# Patient Record
Sex: MALE | Race: WHITE | NOT HISPANIC OR LATINO | Employment: UNEMPLOYED | URBAN - METROPOLITAN AREA
[De-identification: names, ages, dates, MRNs, and addresses within clinical notes are randomized per-mention and may not be internally consistent; named-entity substitution may affect disease eponyms.]

---

## 2022-03-15 ENCOUNTER — HOSPITAL ENCOUNTER (OUTPATIENT)
Dept: ONCOLOGY | Facility: HOSPITAL | Age: 32
Setting detail: INFUSION SERIES
Discharge: HOME | End: 2022-03-15

## 2022-03-15 ENCOUNTER — TRANSCRIBE ORDERS (OUTPATIENT)
Dept: LAB | Facility: HOSPITAL | Age: 32
End: 2022-03-15

## 2022-03-15 ENCOUNTER — LAB (OUTPATIENT)
Dept: LAB | Facility: HOSPITAL | Age: 32
End: 2022-03-15

## 2022-03-15 VITALS
WEIGHT: 185 LBS | HEIGHT: 72 IN | HEART RATE: 119 BPM | RESPIRATION RATE: 16 BRPM | BODY MASS INDEX: 25.06 KG/M2 | TEMPERATURE: 97.8 F | DIASTOLIC BLOOD PRESSURE: 78 MMHG | SYSTOLIC BLOOD PRESSURE: 112 MMHG

## 2022-03-15 DIAGNOSIS — J18.9 UNRESOLVED PNEUMONIA: ICD-10-CM

## 2022-03-15 DIAGNOSIS — J18.9 UNRESOLVED PNEUMONIA: Primary | ICD-10-CM

## 2022-03-15 LAB
ALBUMIN SERPL-MCNC: 3.4 G/DL (ref 3.5–5.2)
ALBUMIN/GLOB SERPL: 0.7 G/DL
ALP SERPL-CCNC: 234 U/L (ref 39–117)
ALT SERPL W P-5'-P-CCNC: 47 U/L (ref 1–41)
ANION GAP SERPL CALCULATED.3IONS-SCNC: 11 MMOL/L (ref 5–15)
AST SERPL-CCNC: 30 U/L (ref 1–40)
BASOPHILS # BLD AUTO: 0.12 10*3/MM3 (ref 0–0.2)
BASOPHILS NFR BLD AUTO: 1.3 % (ref 0–1.5)
BILIRUB SERPL-MCNC: 0.2 MG/DL (ref 0–1.2)
BUN SERPL-MCNC: 18 MG/DL (ref 6–20)
BUN/CREAT SERPL: 15.4 (ref 7–25)
CALCIUM SPEC-SCNC: 9.5 MG/DL (ref 8.6–10.5)
CHLORIDE SERPL-SCNC: 101 MMOL/L (ref 98–107)
CO2 SERPL-SCNC: 25 MMOL/L (ref 22–29)
CREAT SERPL-MCNC: 1.17 MG/DL (ref 0.76–1.27)
CRP SERPL-MCNC: 0.73 MG/DL (ref 0–0.5)
DEPRECATED RDW RBC AUTO: 47.7 FL (ref 37–54)
EGFRCR SERPLBLD CKD-EPI 2021: 85.5 ML/MIN/1.73
EOSINOPHIL # BLD AUTO: 0.25 10*3/MM3 (ref 0–0.4)
EOSINOPHIL NFR BLD AUTO: 2.8 % (ref 0.3–6.2)
ERYTHROCYTE [DISTWIDTH] IN BLOOD BY AUTOMATED COUNT: 14.6 % (ref 12.3–15.4)
ERYTHROCYTE [SEDIMENTATION RATE] IN BLOOD: 61 MM/HR (ref 0–15)
GLOBULIN UR ELPH-MCNC: 5.2 GM/DL
GLUCOSE SERPL-MCNC: 93 MG/DL (ref 65–99)
HCT VFR BLD AUTO: 38.3 % (ref 37.5–51)
HGB BLD-MCNC: 12.3 G/DL (ref 13–17.7)
IMM GRANULOCYTES # BLD AUTO: 0.38 10*3/MM3 (ref 0–0.05)
IMM GRANULOCYTES NFR BLD AUTO: 4.2 % (ref 0–0.5)
LYMPHOCYTES # BLD AUTO: 2.17 10*3/MM3 (ref 0.7–3.1)
LYMPHOCYTES NFR BLD AUTO: 24 % (ref 19.6–45.3)
MCH RBC QN AUTO: 29.3 PG (ref 26.6–33)
MCHC RBC AUTO-ENTMCNC: 32.1 G/DL (ref 31.5–35.7)
MCV RBC AUTO: 91.2 FL (ref 79–97)
MONOCYTES # BLD AUTO: 0.52 10*3/MM3 (ref 0.1–0.9)
MONOCYTES NFR BLD AUTO: 5.7 % (ref 5–12)
NEUTROPHILS NFR BLD AUTO: 5.61 10*3/MM3 (ref 1.7–7)
NEUTROPHILS NFR BLD AUTO: 62 % (ref 42.7–76)
NRBC BLD AUTO-RTO: 0 /100 WBC (ref 0–0.2)
PLATELET # BLD AUTO: 676 10*3/MM3 (ref 140–450)
PMV BLD AUTO: 8.6 FL (ref 6–12)
POTASSIUM SERPL-SCNC: 4.4 MMOL/L (ref 3.5–5.2)
PROT SERPL-MCNC: 8.6 G/DL (ref 6–8.5)
RBC # BLD AUTO: 4.2 10*6/MM3 (ref 4.14–5.8)
SODIUM SERPL-SCNC: 137 MMOL/L (ref 136–145)
WBC NRBC COR # BLD: 9.05 10*3/MM3 (ref 3.4–10.8)

## 2022-03-15 PROCEDURE — 85652 RBC SED RATE AUTOMATED: CPT

## 2022-03-15 PROCEDURE — 36415 COLL VENOUS BLD VENIPUNCTURE: CPT

## 2022-03-15 PROCEDURE — 86140 C-REACTIVE PROTEIN: CPT

## 2022-03-15 PROCEDURE — 80053 COMPREHEN METABOLIC PANEL: CPT

## 2022-03-15 PROCEDURE — G0463 HOSPITAL OUTPT CLINIC VISIT: HCPCS

## 2022-03-15 PROCEDURE — 85025 COMPLETE CBC W/AUTO DIFF WBC: CPT

## 2022-03-15 RX ORDER — METOPROLOL TARTRATE 50 MG/1
50 TABLET, FILM COATED ORAL DAILY
COMMUNITY
End: 2022-04-13 | Stop reason: SDUPTHER

## 2022-03-16 ENCOUNTER — TELEPHONE (OUTPATIENT)
Dept: CARDIOLOGY | Facility: CLINIC | Age: 32
End: 2022-03-16

## 2022-03-16 DIAGNOSIS — R06.02 SHORTNESS OF BREATH: ICD-10-CM

## 2022-03-16 DIAGNOSIS — I87.1 SVC (SUPERIOR VENA CAVA OBSTRUCTION): Primary | ICD-10-CM

## 2022-03-16 NOTE — TELEPHONE ENCOUNTER
This is the patient referred by DEENA Solis that you wanted to order an echo and consult in the next couple of weeks.

## 2022-03-31 ENCOUNTER — OFFICE VISIT (OUTPATIENT)
Dept: CARDIOLOGY | Facility: CLINIC | Age: 32
End: 2022-03-31

## 2022-03-31 VITALS
HEART RATE: 72 BPM | BODY MASS INDEX: 26.14 KG/M2 | WEIGHT: 193 LBS | SYSTOLIC BLOOD PRESSURE: 138 MMHG | DIASTOLIC BLOOD PRESSURE: 96 MMHG | HEIGHT: 72 IN

## 2022-03-31 DIAGNOSIS — E88.81 METABOLIC SYNDROME: ICD-10-CM

## 2022-03-31 DIAGNOSIS — R07.2 PRECORDIAL PAIN: ICD-10-CM

## 2022-03-31 DIAGNOSIS — I10 PRIMARY HYPERTENSION: ICD-10-CM

## 2022-03-31 DIAGNOSIS — J98.2 PNEUMOMEDIASTINUM: Primary | ICD-10-CM

## 2022-03-31 DIAGNOSIS — R06.02 SHORTNESS OF BREATH: ICD-10-CM

## 2022-03-31 DIAGNOSIS — R79.89 ABNORMAL LFTS: ICD-10-CM

## 2022-03-31 DIAGNOSIS — R01.1 MURMUR, CARDIAC: ICD-10-CM

## 2022-03-31 PROCEDURE — 93000 ELECTROCARDIOGRAM COMPLETE: CPT | Performed by: INTERNAL MEDICINE

## 2022-03-31 PROCEDURE — 99244 OFF/OP CNSLTJ NEW/EST MOD 40: CPT | Performed by: INTERNAL MEDICINE

## 2022-03-31 RX ORDER — SACCHAROMYCES BOULARDII 250 MG
250 CAPSULE ORAL 2 TIMES DAILY
COMMUNITY

## 2022-03-31 RX ORDER — ERGOCALCIFEROL 1.25 MG/1
50000 CAPSULE ORAL WEEKLY
COMMUNITY

## 2022-03-31 RX ORDER — AMOXICILLIN AND CLAVULANATE POTASSIUM 500; 125 MG/1; MG/1
1 TABLET, FILM COATED ORAL 2 TIMES DAILY
COMMUNITY

## 2022-03-31 RX ORDER — PANTOPRAZOLE SODIUM 40 MG/1
40 TABLET, DELAYED RELEASE ORAL DAILY
COMMUNITY

## 2022-03-31 NOTE — PROGRESS NOTES
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 Saw PCP, WBC was 38, PCP sent him to ER at [facility] in [place]. SVC clot diagnosed there with eliquis started prior to discharge. Requested records.    • Labs     Recent labs at [facility], will print results     • Hypertension     Metoprolol tart recently started at 50 mg BID, recently decreased due to low heart rate in the 50's        CARDIAC COMPLAINTS  chest pressure/discomfort, dyspnea and fatigue      Subjective   Eliazar Talbot is a 32 y.o. male came in today for his initial cardiac evaluation.  He had no previously diagnosed cardiac history.  In January of this year he apparently had fever chills and had some right-sided chest pain.  It was felt that he could have had COVID at that time.  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  He was told that he had pneumo mediastinum.  He was given steroids and antibiotics and he came home here.  He was not seen by specialist at  who started him on more antibiotics.  He had a CT scan done they noticed there was a thrombus in the SVC.  He was advised to go to the emergency room at [facility].  He was given IV heparin and later was switched to Eliquis.  He did undergo some ultrasound but none of the reports are available to me.  He is now followed by infectious disease Dr.  Because of the thrombus he is now referred for further evaluation.  On talking to him he had this chest pain prior to the pneumomediastinum and is feeling little better.  He also has been noted to have elevated blood pressure for which he was put on Lopressor.  He is not sure when was the last time he had labs done  especially the lipids.  His labs ordered by Dr. Puente showed he had elevated liver function tests especially SGOT SGPT and alkaline phosphatase.  He used to smoke in the past close to 1 and a packs a day but quit in .  He was vaping which he quit last month.  He drinks occasional alcohol.  He does have a very strong family history of heart disease but his father had MI and  at very young age.    History reviewed. No pertinent surgical history.    Current Outpatient Medications   Medication Sig Dispense Refill   • amoxicillin-clavulanate (Augmentin) 500-125 MG per tablet Take 1 tablet by mouth 2 (Two) Times a Day.     • apixaban (ELIQUIS) 5 MG tablet tablet Take  by mouth 2 (Two) Times a Day.     • metoprolol tartrate (LOPRESSOR) 50 MG tablet Take 50 mg by mouth Daily.     • pantoprazole (PROTONIX) 40 MG EC tablet Take 40 mg by mouth Daily.     • saccharomyces boulardii (FLORASTOR) 250 MG capsule Take 250 mg by mouth 2 (Two) Times a Day.     • vitamin D (ERGOCALCIFEROL) 1.25 MG (66474 UT) capsule capsule Take 50,000 Units by mouth 1 (One) Time Per Week.       No current facility-administered medications for this visit.           ALLERGIES:  Patient has no known allergies.    Past Medical History:   Diagnosis Date   • GERD (gastroesophageal reflux disease)    • Seasonal allergies    • Vitamin D deficiency        Social History     Tobacco Use   Smoking Status Former Smoker   • Packs/day: 1.50   • Years: 15.00   • Pack years: 22.50   • Types: Cigarettes   • Quit date:    • Years since quittin.2   Smokeless Tobacco Never Used          Family History   Problem Relation Age of Onset   • Hyperlipidemia Mother    • Hypertension Mother    • Heart attack Father         first MI at 38, second at 40, last MI at 54   • Heart disease Father         CABG   • Hyperlipidemia Father    • Hypertension Father    • Cancer Father    • No Known Problems Brother    • No Known Problems Brother    • Hypertension Maternal  "Grandmother    • Diabetes Maternal Grandmother    • Lung disease Maternal Grandmother    • Cancer Maternal Grandfather    • Hyperlipidemia Maternal Grandfather    • Heart attack Paternal Grandfather    • Cirrhosis Paternal Grandfather        Review of Systems   Constitutional: Negative for decreased appetite and malaise/fatigue.   HENT: Negative for congestion and sore throat.    Eyes: Negative for blurred vision.   Cardiovascular: Positive for chest pain.   Respiratory: Negative for shortness of breath and snoring.    Endocrine: Negative for cold intolerance and heat intolerance.   Hematologic/Lymphatic: Negative for adenopathy. Does not bruise/bleed easily.   Skin: Negative for itching, nail changes and skin cancer.   Musculoskeletal: Negative for arthritis and myalgias.   Gastrointestinal: Negative for abdominal pain, dysphagia and heartburn.   Genitourinary: Negative for bladder incontinence and frequency.   Neurological: Negative for dizziness, light-headedness, seizures and vertigo.   Psychiatric/Behavioral: Negative for altered mental status.   Allergic/Immunologic: Negative for environmental allergies and hives.       Diabetes- No  Thyroid- normal    Objective     /96 (BP Location: Left arm)   Pulse 72   Ht 182.9 cm (72\")   Wt 87.5 kg (193 lb)   BMI 26.18 kg/m²     Vitals and nursing note reviewed.   Constitutional:       Appearance: Healthy appearance. Not in distress.   Eyes:      Conjunctiva/sclera: Conjunctivae normal.      Pupils: Pupils are equal, round, and reactive to light.   HENT:      Head: Normocephalic.   Pulmonary:      Effort: Pulmonary effort is normal.      Breath sounds: Normal breath sounds.   Cardiovascular:      PMI at left midclavicular line. Normal rate. Regular rhythm.      Murmurs: There is a systolic murmur.   Abdominal:      General: Bowel sounds are normal.      Palpations: Abdomen is soft.   Musculoskeletal: Normal range of motion.      Cervical back: Normal range of " motion and neck supple. Skin:     General: Skin is warm and dry.   Neurological:      Mental Status: Alert, oriented to person, place, and time and oriented to person, place and time.           ECG 12 Lead    Date/Time: 3/31/2022 4:58 PM  Performed by: Lizette Boucher MD  Authorized by: Lizette Boucher MD   Previous ECG: no previous ECG available  Rhythm: sinus rhythm  Rate: normal  QRS axis: normal  Other findings: non-specific ST-T wave changes    Clinical impression: abnormal EKG              Assessment/Plan   Patient's Body mass index is 26.18 kg/m². indicating that he is overweight (BMI 25-29.9). Patient's (Body mass index is 26.18 kg/m².) indicates that they are overweight with health conditions that include hypertension . Weight is newly identified. BMI is is above average; BMI management plan is completed. We discussed portion control and increasing exercise.     Diagnoses and all orders for this visit:    1. Pneumomediastinum (HCC) (Primary)    2. Primary hypertension    3. Shortness of breath  -     Lipid Panel; Future    4. Metabolic syndrome  -     Lipid Panel; Future    5. Murmur, cardiac    6. Abnormal LFTs  -     Comprehensive Metabolic Panel; Future  -     Cancel: Hepatitis A and B Profile; Future  -     Cancel: Hepatitis B Virus Profile; Future  -     Hepatitis C antibody; Future  -     Hepatitis A antibody, total; Future  -     Hepatitis B core antibody, total; Future  -     Hepatitis B surface antibody; Future  -     Hepatitis B surface antigen; Future    7. Precordial pain    Other orders  -     ECG 12 Lead    At baseline his heart rate is stable.  His blood pressure is upper limit of normal.  His EKG shows sinus rhythm with nonspecific ST-T changes.  No Q waves noted.  His clinical examination reveals a BMI of 26.  He does have a slightly diminished air entry on the right side.    Regarding the pneumomediastinum, at this time we do not know exactly the reason for it.  He is now being  followed by an ID specialist.  Possibility of this secondary to COVID is also present.  At this time I like to get an echocardiogram to evaluate the LV function, valvular structures and also to rule out any pericardial effusion.    Regarding his hypertension it is still slightly elevated but at this time continue the Lopressor.  If the echocardiogram shows any evidence of LVH then will add an ACE inhibitor.    Regarding the shortness of breath, it is a combination of both having the infection and also from reduce pulmonary capacity.  Will review the CT scan which is going to be done tomorrow    Regarding the metabolic syndrome, talked to him about diet and weight reduction.  I also advised him to have his cholesterol checked    Regarding the chest pain he had in the past it could be from the infection itself but given the fact that his EKG is slightly abnormal he may need an elective stress test    Regarding his abnormal LFT, like to recheck it again and also check his hepatitis A B as well as C.    Based on the results, further recommendations will be made.               Electronically signed by Lizette Boucher MD March 31, 2022 16:59 EDT

## 2022-04-01 ENCOUNTER — HOSPITAL ENCOUNTER (OUTPATIENT)
Dept: CARDIOLOGY | Facility: HOSPITAL | Age: 32
Discharge: HOME | End: 2022-04-01
Admitting: INTERNAL MEDICINE

## 2022-04-01 DIAGNOSIS — R06.02 SHORTNESS OF BREATH: ICD-10-CM

## 2022-04-01 DIAGNOSIS — I87.1 SVC (SUPERIOR VENA CAVA OBSTRUCTION): ICD-10-CM

## 2022-04-01 PROCEDURE — 93306 TTE W/DOPPLER COMPLETE: CPT

## 2022-04-01 PROCEDURE — 93306 TTE W/DOPPLER COMPLETE: CPT | Performed by: INTERNAL MEDICINE

## 2022-04-08 LAB
BH CV ECHO MEAS - ACS: 2.22 CM
BH CV ECHO MEAS - AO MAX PG: 6.1 MMHG
BH CV ECHO MEAS - AO MEAN PG: 3.3 MMHG
BH CV ECHO MEAS - AO ROOT DIAM: 3.2 CM
BH CV ECHO MEAS - AO V2 MAX: 123.5 CM/SEC
BH CV ECHO MEAS - AO V2 VTI: 26.2 CM
BH CV ECHO MEAS - EDV(CUBED): 85.8 ML
BH CV ECHO MEAS - EDV(MOD-SP4): 114 ML
BH CV ECHO MEAS - EF(MOD-SP4): 61.2 %
BH CV ECHO MEAS - ESV(CUBED): 42.1 ML
BH CV ECHO MEAS - ESV(MOD-SP4): 44.2 ML
BH CV ECHO MEAS - FS: 21.1 %
BH CV ECHO MEAS - IVS/LVPW: 1.2 CM
BH CV ECHO MEAS - IVSD: 1.14 CM
BH CV ECHO MEAS - LA DIMENSION: 3.6 CM
BH CV ECHO MEAS - LAT PEAK E' VEL: 11.3 CM/SEC
BH CV ECHO MEAS - LV DIASTOLIC VOL/BSA (35-75): 54.3 CM2
BH CV ECHO MEAS - LV MASS(C)D: 157.6 GRAMS
BH CV ECHO MEAS - LV SYSTOLIC VOL/BSA (12-30): 21.1 CM2
BH CV ECHO MEAS - LVIDD: 4.4 CM
BH CV ECHO MEAS - LVIDS: 3.5 CM
BH CV ECHO MEAS - LVOT AREA: 2.8 CM2
BH CV ECHO MEAS - LVOT DIAM: 1.87 CM
BH CV ECHO MEAS - LVPWD: 0.95 CM
BH CV ECHO MEAS - MED PEAK E' VEL: 7.9 CM/SEC
BH CV ECHO MEAS - MV A MAX VEL: 58.7 CM/SEC
BH CV ECHO MEAS - MV DEC SLOPE: 290.1 CM/SEC2
BH CV ECHO MEAS - MV E MAX VEL: 67.3 CM/SEC
BH CV ECHO MEAS - MV E/A: 1.15
BH CV ECHO MEAS - RAP SYSTOLE: 10 MMHG
BH CV ECHO MEAS - RVDD: 3 CM
BH CV ECHO MEAS - RVSP: 23.4 MMHG
BH CV ECHO MEAS - SI(MOD-SP4): 33.3 ML/M2
BH CV ECHO MEAS - SV(MOD-SP4): 69.8 ML
BH CV ECHO MEAS - TR MAX PG: 13.4 MMHG
BH CV ECHO MEAS - TR MAX VEL: 183.2 CM/SEC
BH CV ECHO MEASUREMENTS AVERAGE E/E' RATIO: 7.01
BH CV LEA LEFT PERONEAL  DISTAL EDV: 3.6 CM/S
BH CV LEA LEFT PERONEAL  DISTAL PSV: 3.6 CM/S
BH CV LEA LEFT PERONEAL  MID EDV: 3.6 CM/S
BH CV LEA LEFT PERONEAL  MID PSV: 3.6 CM/S
BH CV LEA LEFT PERONEAL  PROX EDV: 3.6 CM/S
BH CV LEA LEFT PERONEAL  PROX PSV: 3.6 CM/S
BH CV LEA LEFT PTA DISTAL EDV: 3.6 CM/S
BH CV LEA LEFT PTA DISTAL PSV: 3.6 CM/S
BH CV LEA LEFT PTA MID EDV: 3.6 CM/S
BH CV LEA LEFT PTA MID PSV: 3.6 CM/S
BH CV LEA LEFT PTA PROX EDV: 3.6 CM/S
BH CV LEA LEFT PTA PROX PSV: 3.6 CM/S
LEFT ATRIUM VOLUME INDEX: 12.2 ML/M2
MAXIMAL PREDICTED HEART RATE: 189 BPM
STRESS TARGET HR: 161 BPM

## 2022-04-13 ENCOUNTER — TELEPHONE (OUTPATIENT)
Dept: CARDIOLOGY | Facility: CLINIC | Age: 32
End: 2022-04-13

## 2022-04-13 RX ORDER — METOPROLOL TARTRATE 50 MG/1
50 TABLET, FILM COATED ORAL DAILY
Qty: 30 TABLET | Refills: 6 | Status: SHIPPED | OUTPATIENT
Start: 2022-04-13

## 2022-04-13 NOTE — TELEPHONE ENCOUNTER
See echo results note.  Refills on patient's metoprolol and Eliquis sent to [facility].

## 2022-04-19 ENCOUNTER — APPOINTMENT (OUTPATIENT)
Dept: CARDIOLOGY | Facility: HOSPITAL | Age: 32
End: 2022-04-19